# Patient Record
Sex: MALE | HISPANIC OR LATINO | Employment: UNEMPLOYED | URBAN - METROPOLITAN AREA
[De-identification: names, ages, dates, MRNs, and addresses within clinical notes are randomized per-mention and may not be internally consistent; named-entity substitution may affect disease eponyms.]

---

## 2021-09-15 ENCOUNTER — HOSPITAL ENCOUNTER (EMERGENCY)
Facility: CLINIC | Age: 21
Discharge: HOME | End: 2021-09-16
Attending: EMERGENCY MEDICINE | Admitting: EMERGENCY MEDICINE
Payer: OTHER GOVERNMENT

## 2021-09-15 DIAGNOSIS — R50.9 FEVER AND CHILLS: ICD-10-CM

## 2021-09-15 PROCEDURE — 99283 EMERGENCY DEPT VISIT LOW MDM: CPT | Performed by: EMERGENCY MEDICINE

## 2021-09-15 PROCEDURE — 99284 EMERGENCY DEPT VISIT MOD MDM: CPT | Performed by: EMERGENCY MEDICINE

## 2021-09-15 NOTE — LETTER
September 16, 2021      To Whom It May Concern:      Ruben Van was seen in our Emergency Department today, 09/16/21.  I expect his condition to improve over the next 1-3 days.  He may return to work when improved.    Sincerely,        Kaylyn Espinoza RN

## 2021-09-16 VITALS
RESPIRATION RATE: 16 BRPM | DIASTOLIC BLOOD PRESSURE: 72 MMHG | HEART RATE: 107 BPM | TEMPERATURE: 99 F | OXYGEN SATURATION: 100 % | SYSTOLIC BLOOD PRESSURE: 123 MMHG

## 2021-09-16 LAB — SARS-COV-2 RNA RESP QL NAA+PROBE: NEGATIVE

## 2021-09-16 PROCEDURE — 250N000013 HC RX MED GY IP 250 OP 250 PS 637: Performed by: EMERGENCY MEDICINE

## 2021-09-16 PROCEDURE — C9803 HOPD COVID-19 SPEC COLLECT: HCPCS | Performed by: EMERGENCY MEDICINE

## 2021-09-16 PROCEDURE — 87635 SARS-COV-2 COVID-19 AMP PRB: CPT | Performed by: EMERGENCY MEDICINE

## 2021-09-16 RX ORDER — ACETAMINOPHEN 325 MG/10.15ML
650 LIQUID ORAL ONCE
Status: COMPLETED | OUTPATIENT
Start: 2021-09-16 | End: 2021-09-16

## 2021-09-16 RX ORDER — IBUPROFEN 600 MG/1
600 TABLET, FILM COATED ORAL ONCE
Status: COMPLETED | OUTPATIENT
Start: 2021-09-16 | End: 2021-09-16

## 2021-09-16 RX ORDER — NAPROXEN 500 MG/1
500 TABLET ORAL 2 TIMES DAILY WITH MEALS
Qty: 30 TABLET | Refills: 0 | Status: SHIPPED | OUTPATIENT
Start: 2021-09-16 | End: 2021-10-01

## 2021-09-16 RX ADMIN — IBUPROFEN 600 MG: 600 TABLET ORAL at 00:47

## 2021-09-16 RX ADMIN — ACETAMINOPHEN 650 MG: 325 SOLUTION ORAL at 00:47

## 2021-09-16 ASSESSMENT — ENCOUNTER SYMPTOMS
SHORTNESS OF BREATH: 0
MYALGIAS: 1
FEVER: 1
ABDOMINAL PAIN: 0

## 2021-09-16 NOTE — ED TRIAGE NOTES
"Pt woke up today and felt \"bad\" with fever, headache, spinal pain and leg pain. With increased pain and fever pt came to ER. Pt has not had this before.   Pt took Umbral medicine to make himself feel better.Pt states he has not traveled anywhere and has not been around anyone that has traveled.  "

## 2021-09-16 NOTE — ED PROVIDER NOTES
"ED Provider Note  [facility]      History     Chief Complaint   Patient presents with     Generalized Body Aches     Pt described \"all over body aches\"     Fever     Subjective     HPI  Ruben Van is a 21 year old male who presents to the ED with complaints of fever and generalized body aches.  He woke up this way this morning.  He denies any coughing or shortness of breath.  No nausea or vomiting.  No specific pain anywhere.    Past Medical History  History reviewed. No pertinent past medical history.  History reviewed. No pertinent surgical history.  naproxen (NAPROSYN) 500 MG tablet      No Known Allergies  Family History  History reviewed. No pertinent family history.  Social History   Social History     Tobacco Use     Smoking status: Never Smoker     Smokeless tobacco: Never Used   Substance Use Topics     Alcohol use: Yes     Drug use: Never      Past medical history, past surgical history, medications, allergies, family history, and social history were reviewed with the patient. No additional pertinent items.       Review of Systems   Constitutional: Positive for fever.   Respiratory: Negative for shortness of breath.    Cardiovascular: Negative for chest pain.   Gastrointestinal: Negative for abdominal pain.   Musculoskeletal: Positive for myalgias.   All other systems reviewed and are negative.    A complete review of systems was performed with pertinent positives and negatives noted in the HPI, and all other systems negative.    Physical Exam   BP: 113/74  Pulse: 120  Temp: (!) 101.5  F (38.6  C)  Resp: 17  SpO2: 100 %  Physical Exam  Constitutional:       General: He is not in acute distress.     Appearance: He is not diaphoretic.   HENT:      Head: Normocephalic.      Mouth/Throat:      Pharynx: No oropharyngeal exudate.   Eyes:      Extraocular Movements: Extraocular movements intact.   Cardiovascular:      Heart sounds: Normal heart sounds.   Pulmonary:      Effort: No " respiratory distress.      Breath sounds: Normal breath sounds.   Abdominal:      General: There is no distension.      Palpations: Abdomen is soft.      Tenderness: There is no abdominal tenderness.   Musculoskeletal:         General: No deformity.      Cervical back: Neck supple.   Skin:     General: Skin is dry.   Neurological:      Mental Status: He is alert.      Comments: alert   Psychiatric:         Behavior: Behavior normal.         ED Course      Procedures       Results for orders placed or performed during the hospital encounter of 09/15/21   Symptomatic COVID-19 Virus (Coronavirus) by PCR Nasopharyngeal     Status: Normal    Specimen: Nasopharyngeal; Swab   Result Value Ref Range    SARS CoV2 PCR Negative Negative    Narrative    Testing was performed using the gera  SARS-CoV-2 & Influenza A/B Assay on the gera  Mulu  System.  This test should be ordered for the detection of SARS-COV-2 in individuals who meet SARS-CoV-2 clinical and/or epidemiological criteria. Test performance is unknown in asymptomatic patients.  This test is for in vitro diagnostic use under the FDA EUA for laboratories certified under CLIA to perform moderate and/or high complexity testing. This test has not been FDA cleared or approved.  A negative test does not rule out the presence of PCR inhibitors in the specimen or target RNA in concentration below the limit of detection for the assay. The possibility of a false negative should be considered if the patient's recent exposure or clinical presentation suggests COVID-19.  [facility] are certified under the Clinical Laboratory Improvement Amendments of 1988 (CLIA-88) as qualified to perform moderate and/or high complexity laboratory testing.          Results for orders placed or performed during the hospital encounter of 09/15/21   Symptomatic COVID-19 Virus (Coronavirus) by PCR Nasopharyngeal     Status: Normal    Specimen: Nasopharyngeal; Swab   Result Value  Ref Range    SARS CoV2 PCR Negative Negative    Narrative    Testing was performed using the gera  SARS-CoV-2 & Influenza A/B Assay on the gera  Mulu  System.  This test should be ordered for the detection of SARS-COV-2 in individuals who meet SARS-CoV-2 clinical and/or epidemiological criteria. Test performance is unknown in asymptomatic patients.  This test is for in vitro diagnostic use under the FDA EUA for laboratories certified under CLIA to perform moderate and/or high complexity testing. This test has not been FDA cleared or approved.  A negative test does not rule out the presence of PCR inhibitors in the specimen or target RNA in concentration below the limit of detection for the assay. The possibility of a false negative should be considered if the patient's recent exposure or clinical presentation suggests COVID-19.  [facility] are certified under the Clinical Laboratory Improvement Amendments of 1988 (CLIA-88) as qualified to perform moderate and/or high complexity laboratory testing.     Medications   ibuprofen (ADVIL/MOTRIN) tablet 600 mg (600 mg Oral Given 9/16/21 0047)   acetaminophen (TYLENOL) solution 650 mg (650 mg Oral Given 9/16/21 0047)        Assessments & Plan (with Medical Decision Making)   21-year-old male presents to us with a chief complaint of fever myalgias.  He was given Tylenol and ibuprofen which did resolve his symptoms.  Covid test was negative.  Patient had no respiratory symptoms suggesting pneumonia.  He had no abdominal tenderness or nausea and vomiting suggesting source in his abdomen.  He denies any rash.  I do not have an obvious cause for his fever.  This likely represents a viral illness.  He will be given Naprosyn to help with the symptoms and follow-up with primary care as needed.    I have reviewed the nursing notes. I have reviewed the findings, diagnosis, plan and need for follow up with the patient.    Discharge Medication List as of 9/16/2021   2:06 AM      START taking these medications    Details   naproxen (NAPROSYN) 500 MG tablet Take 1 tablet (500 mg) by mouth 2 times daily (with meals) for 15 days, Disp-30 tablet, R-0, Local Print             Final diagnoses:   Fever and chills       --    [facility] EMERGENCY DEPARTMENT  9/15/2021     Erlin Philip,   09/16/21 0254

## 2022-05-30 ENCOUNTER — APPOINTMENT (OUTPATIENT)
Dept: INTERPRETER SERVICES | Facility: CLINIC | Age: 22
End: 2022-05-30

## 2022-06-06 ENCOUNTER — HOSPITAL ENCOUNTER (EMERGENCY)
Facility: CLINIC | Age: 22
Discharge: HOME | End: 2022-06-06
Attending: INTERNAL MEDICINE | Admitting: INTERNAL MEDICINE

## 2022-06-06 VITALS
HEART RATE: 93 BPM | DIASTOLIC BLOOD PRESSURE: 78 MMHG | TEMPERATURE: 98.3 F | OXYGEN SATURATION: 100 % | SYSTOLIC BLOOD PRESSURE: 135 MMHG | RESPIRATION RATE: 18 BRPM

## 2022-06-06 DIAGNOSIS — M79.641 PAIN IN BOTH HANDS: ICD-10-CM

## 2022-06-06 DIAGNOSIS — M79.642 PAIN IN BOTH HANDS: ICD-10-CM

## 2022-06-06 LAB
ALBUMIN SERPL-MCNC: 4.2 G/DL (ref 3.4–5)
ALP SERPL-CCNC: 94 U/L (ref 40–150)
ALT SERPL W P-5'-P-CCNC: 28 U/L (ref 0–70)
ANION GAP SERPL CALCULATED.3IONS-SCNC: 7 MMOL/L (ref 3–14)
AST SERPL W P-5'-P-CCNC: 22 U/L (ref 0–45)
BASOPHILS # BLD AUTO: 0 10E3/UL (ref 0–0.2)
BASOPHILS NFR BLD AUTO: 0 %
BILIRUB SERPL-MCNC: 0.7 MG/DL (ref 0.2–1.3)
BUN SERPL-MCNC: 18 MG/DL (ref 7–30)
CALCIUM SERPL-MCNC: 9.4 MG/DL (ref 8.5–10.1)
CHLORIDE BLD-SCNC: 105 MMOL/L (ref 94–109)
CO2 SERPL-SCNC: 26 MMOL/L (ref 20–32)
CREAT SERPL-MCNC: 0.86 MG/DL (ref 0.66–1.25)
CRP SERPL-MCNC: <2.9 MG/L (ref 0–8)
EOSINOPHIL # BLD AUTO: 0.1 10E3/UL (ref 0–0.7)
EOSINOPHIL NFR BLD AUTO: 1 %
ERYTHROCYTE [DISTWIDTH] IN BLOOD BY AUTOMATED COUNT: 11.9 % (ref 10–15)
ERYTHROCYTE [SEDIMENTATION RATE] IN BLOOD BY WESTERGREN METHOD: 6 MM/HR (ref 0–15)
GFR SERPL CREATININE-BSD FRML MDRD: >90 ML/MIN/1.73M2
GLUCOSE BLD-MCNC: 104 MG/DL (ref 70–99)
HCT VFR BLD AUTO: 46.7 % (ref 40–53)
HGB BLD-MCNC: 16.1 G/DL (ref 13.3–17.7)
IMM GRANULOCYTES # BLD: 0 10E3/UL
IMM GRANULOCYTES NFR BLD: 0 %
LYMPHOCYTES # BLD AUTO: 1.7 10E3/UL (ref 0.8–5.3)
LYMPHOCYTES NFR BLD AUTO: 19 %
MAGNESIUM SERPL-MCNC: 2.2 MG/DL (ref 1.6–2.3)
MCH RBC QN AUTO: 31 PG (ref 26.5–33)
MCHC RBC AUTO-ENTMCNC: 34.5 G/DL (ref 31.5–36.5)
MCV RBC AUTO: 90 FL (ref 78–100)
MONOCYTES # BLD AUTO: 0.7 10E3/UL (ref 0–1.3)
MONOCYTES NFR BLD AUTO: 7 %
NEUTROPHILS # BLD AUTO: 6.5 10E3/UL (ref 1.6–8.3)
NEUTROPHILS NFR BLD AUTO: 73 %
NRBC # BLD AUTO: 0 10E3/UL
NRBC BLD AUTO-RTO: 0 /100
PLATELET # BLD AUTO: 218 10E3/UL (ref 150–450)
POTASSIUM BLD-SCNC: 3.4 MMOL/L (ref 3.4–5.3)
PROT SERPL-MCNC: 8 G/DL (ref 6.8–8.8)
RBC # BLD AUTO: 5.19 10E6/UL (ref 4.4–5.9)
SODIUM SERPL-SCNC: 138 MMOL/L (ref 133–144)
WBC # BLD AUTO: 9 10E3/UL (ref 4–11)

## 2022-06-06 PROCEDURE — 85652 RBC SED RATE AUTOMATED: CPT | Performed by: INTERNAL MEDICINE

## 2022-06-06 PROCEDURE — 80053 COMPREHEN METABOLIC PANEL: CPT | Performed by: INTERNAL MEDICINE

## 2022-06-06 PROCEDURE — 99283 EMERGENCY DEPT VISIT LOW MDM: CPT | Performed by: INTERNAL MEDICINE

## 2022-06-06 PROCEDURE — 86140 C-REACTIVE PROTEIN: CPT | Performed by: INTERNAL MEDICINE

## 2022-06-06 PROCEDURE — 250N000013 HC RX MED GY IP 250 OP 250 PS 637: Performed by: INTERNAL MEDICINE

## 2022-06-06 PROCEDURE — 99284 EMERGENCY DEPT VISIT MOD MDM: CPT | Performed by: INTERNAL MEDICINE

## 2022-06-06 PROCEDURE — 83735 ASSAY OF MAGNESIUM: CPT | Performed by: INTERNAL MEDICINE

## 2022-06-06 PROCEDURE — 85025 COMPLETE CBC W/AUTO DIFF WBC: CPT | Performed by: INTERNAL MEDICINE

## 2022-06-06 PROCEDURE — 36415 COLL VENOUS BLD VENIPUNCTURE: CPT | Performed by: INTERNAL MEDICINE

## 2022-06-06 RX ORDER — IBUPROFEN 600 MG/1
600 TABLET, FILM COATED ORAL ONCE
Status: COMPLETED | OUTPATIENT
Start: 2022-06-06 | End: 2022-06-06

## 2022-06-06 RX ORDER — NAPROXEN 500 MG/1
500 TABLET ORAL 2 TIMES DAILY PRN
Qty: 30 TABLET | Refills: 0 | Status: SHIPPED | OUTPATIENT
Start: 2022-06-06

## 2022-06-06 RX ADMIN — IBUPROFEN 600 MG: 600 TABLET ORAL at 20:24

## 2022-06-06 ASSESSMENT — ENCOUNTER SYMPTOMS
DIFFICULTY URINATING: 0
CONFUSION: 0
NECK STIFFNESS: 0
COLOR CHANGE: 0
ARTHRALGIAS: 1
CONSTIPATION: 0
SHORTNESS OF BREATH: 0
HEADACHES: 0
EYE REDNESS: 0
FEVER: 0
BLOOD IN STOOL: 0
DIARRHEA: 0
ABDOMINAL PAIN: 0

## 2022-06-06 NOTE — ED TRIAGE NOTES
Pt states via : that at 11am today while at work he developed bilat arm sensation as though they are on fire. Pt states no known injury. No redness to FA noted. FROM/CMS intact.     Triage Assessment     Row Name 06/06/22 2061       Triage Assessment (Adult)    Airway WDL WDL       Respiratory WDL    Respiratory WDL WDL       Skin Circulation/Temperature WDL    Skin Circulation/Temperature WDL WDL       Cardiac WDL    Cardiac WDL WDL       Peripheral/Neurovascular WDL    Peripheral Neurovascular WDL WDL       Cognitive/Neuro/Behavioral WDL    Cognitive/Neuro/Behavioral WDL WDL

## 2022-06-07 LAB
HOLD SPECIMEN: NORMAL
HOLD SPECIMEN: NORMAL

## 2022-06-07 NOTE — ED PROVIDER NOTES
[facility] [place] ([facility])    6/06/22        History     Chief Complaint   Patient presents with     Arm Pain     The history is provided by the patient and medical records. The history is limited by a language barrier. A  was used (ipad).     Ruben Van is a 22 year old male who presents to the ED for evaluation of bilateral hand and wrist pain.  Patient is Greenlandic-speaking and an iPad  was used.  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  He states he was dealing with foods such as chicken, rice, frijoles, beans, cheese, guacamole, corn; he states he did not touch any peppers.  Pain has been constant since.  States that his legs and neck are fine.  Denies swelling.  No problems with bowel or bladder.  No fever.  He also presents with small bumps on his left wrist which he believes are from chemicals that he uses at work. No other new symptoms.      Past Medical History  No past medical history on file.  No past surgical history on file.  naproxen (NAPROSYN) 500 MG tablet      No Known Allergies  Family History  No family history on file.  Social History   Social History     Tobacco Use     Smoking status: Never Smoker     Smokeless tobacco: Never Used   Substance Use Topics     Alcohol use: Yes     Drug use: Never      Past medical history, past surgical history, medications, allergies, family history, and social history were reviewed with the patient. No additional pertinent items.        Review of Systems   Constitutional: Negative for fever.   HENT: Negative for congestion.    Eyes: Negative for redness.   Respiratory: Negative for shortness of breath.    Cardiovascular: Negative for chest pain.   Gastrointestinal: Negative for abdominal pain, blood in stool, constipation and diarrhea.   Genitourinary: Negative.   Negative for difficulty urinating.   Musculoskeletal: Positive for arthralgias (Bilateral wrists). Negative for neck stiffness.   Skin: Negative for color change.   Neurological: Negative for headaches.   Psychiatric/Behavioral: Negative for confusion.     A complete review of systems was performed with pertinent positives and negatives noted in the HPI, and all other systems negative.    Physical Exam   BP: 135/78  Pulse: 93  Temp: 98.3  F (36.8  C)  Resp: 18  SpO2: 100 %  Physical Exam  Constitutional:       General: He is not in acute distress.     Appearance: He is not diaphoretic.   HENT:      Head: Atraumatic.   Eyes:      General: No scleral icterus.     Pupils: Pupils are equal, round, and reactive to light.   Cardiovascular:      Rate and Rhythm: Normal rate and regular rhythm.      Heart sounds: Normal heart sounds. No murmur heard.    No friction rub. No gallop.   Pulmonary:      Effort: Pulmonary effort is normal. No respiratory distress.      Breath sounds: Normal breath sounds. No stridor. No wheezing, rhonchi or rales.   Chest:      Chest wall: No tenderness.   Abdominal:      General: Abdomen is flat. Bowel sounds are normal. There is no distension.      Palpations: Abdomen is soft. There is no mass.      Tenderness: There is no abdominal tenderness. There is no right CVA tenderness, left CVA tenderness, guarding or rebound.      Hernia: No hernia is present.   Musculoskeletal:         General: No tenderness.      Cervical back: Neck supple.   Skin:     General: Skin is warm.      Findings: No rash.   Neurological:      General: No focal deficit present.      Cranial Nerves: No cranial nerve deficit.      Sensory: No sensory deficit.      Motor: No weakness.      Coordination: Coordination normal.      Gait: Gait normal.      Deep Tendon Reflexes: Reflexes normal.         ED Course     8:06 PM  The patient was seen and examined by Seymour Cardenas MD in Room ED18.     Procedures                      Results for orders placed or performed during the hospital encounter of 06/06/22   Comprehensive metabolic panel     Status: Abnormal   Result Value Ref Range    Sodium 138 133 - 144 mmol/L    Potassium 3.4 3.4 - 5.3 mmol/L    Chloride 105 94 - 109 mmol/L    Carbon Dioxide (CO2) 26 20 - 32 mmol/L    Anion Gap 7 3 - 14 mmol/L    Urea Nitrogen 18 7 - 30 mg/dL    Creatinine 0.86 0.66 - 1.25 mg/dL    Calcium 9.4 8.5 - 10.1 mg/dL    Glucose 104 (H) 70 - 99 mg/dL    Alkaline Phosphatase 94 40 - 150 U/L    AST 22 0 - 45 U/L    ALT 28 0 - 70 U/L    Protein Total 8.0 6.8 - 8.8 g/dL    Albumin 4.2 3.4 - 5.0 g/dL    Bilirubin Total 0.7 0.2 - 1.3 mg/dL    GFR Estimate >90 >60 mL/min/1.73m2   Magnesium     Status: Normal   Result Value Ref Range    Magnesium 2.2 1.6 - 2.3 mg/dL   CRP inflammation     Status: Normal   Result Value Ref Range    CRP Inflammation <2.9 0.0 - 8.0 mg/L   Erythrocyte sedimentation rate auto     Status: Normal   Result Value Ref Range    Erythrocyte Sedimentation Rate 6 0 - 15 mm/hr   CBC with platelets and differential     Status: None   Result Value Ref Range    WBC Count 9.0 4.0 - 11.0 10e3/uL    RBC Count 5.19 4.40 - 5.90 10e6/uL    Hemoglobin 16.1 13.3 - 17.7 g/dL    Hematocrit 46.7 40.0 - 53.0 %    MCV 90 78 - 100 fL    MCH 31.0 26.5 - 33.0 pg    MCHC 34.5 31.5 - 36.5 g/dL    RDW 11.9 10.0 - 15.0 %    Platelet Count 218 150 - 450 10e3/uL    % Neutrophils 73 %    % Lymphocytes 19 %    % Monocytes 7 %    % Eosinophils 1 %    % Basophils 0 %    % Immature Granulocytes 0 %    NRBCs per 100 WBC 0 <1 /100    Absolute Neutrophils 6.5 1.6 - 8.3 10e3/uL    Absolute Lymphocytes 1.7 0.8 - 5.3 10e3/uL    Absolute Monocytes 0.7 0.0 - 1.3 10e3/uL    Absolute Eosinophils 0.1 0.0 - 0.7 10e3/uL    Absolute Basophils 0.0 0.0 - 0.2 10e3/uL    Absolute Immature Granulocytes 0.0 <=0.4 10e3/uL    Absolute NRBCs 0.0 10e3/uL   Extra Tube     Status: None (In process)    Narrative    The following orders were created for  panel order Extra Tube.  Procedure                               Abnormality         Status                     ---------                               -----------         ------                     Extra Red Top Tube[[id number]]                               In process                   Please view results for these tests on the individual orders.   Extra Tube ([place] Draw)     Status: None (In process)    Narrative    The following orders were created for panel order Extra Tube ([place] Draw).  Procedure                               Abnormality         Status                     ---------                               -----------         ------                     Extra Blue Top Tube[[id number]]                              In process                   Please view results for these tests on the individual orders.   CBC with platelets differential     Status: None    Narrative    The following orders were created for panel order CBC with platelets differential.  Procedure                               Abnormality         Status                     ---------                               -----------         ------                     CBC with platelets and d...[[id number]]                      Final result                 Please view results for these tests on the individual orders.     Medications   ibuprofen (ADVIL/MOTRIN) tablet 600 mg (600 mg Oral Given 6/6/22 2024)        Assessments & Plan (with Medical Decision Making)  Bilateral hands pain of unclear etiology but labs including lytes and inflammatory markers normal, clinically still suspicious for capsaicin of food related chemical burns, will discharge with naproxen prn, follow up with his PMD.       I have reviewed the nursing notes. I have reviewed the findings, diagnosis, plan and need for follow up with the patient.    Discharge Medication List as of 6/6/2022 10:32 PM      START taking these medications    Details   naproxen (NAPROSYN) 500 MG tablet Take  1 tablet (500 mg) by mouth 2 times daily as needed for moderate pain, Disp-30 tablet, R-0, Local Print             Final diagnoses:   Pain in both hands     IConsuelo, am serving as a trained medical scribe to document services personally performed by Seymour Cardenas MD based on the provider's statements to me on June 6, 2022.  This document has been checked and approved by the attending provider.    ISeymour MD, was physically present and have reviewed and verified the accuracy of this note documented by Consuelo Clay, medical scribe.      Seymour Cardenas MD      --  Seymour Cardenas Md  [facility] EMERGENCY DEPARTMENT  6/6/2022     Seymour Cardenas MD  06/06/22 [id number]       Seymour Cardenas MD  06/06/22 [id number]

## 2023-03-05 ENCOUNTER — HOSPITAL ENCOUNTER (EMERGENCY)
Facility: CLINIC | Age: 23
Discharge: HOME | End: 2023-03-05
Attending: EMERGENCY MEDICINE | Admitting: EMERGENCY MEDICINE
Payer: MEDICAID

## 2023-03-05 ENCOUNTER — APPOINTMENT (OUTPATIENT)
Dept: CT IMAGING | Facility: CLINIC | Age: 23
End: 2023-03-05
Attending: EMERGENCY MEDICINE

## 2023-03-05 VITALS
OXYGEN SATURATION: 99 % | DIASTOLIC BLOOD PRESSURE: 74 MMHG | WEIGHT: 129.4 LBS | TEMPERATURE: 98.4 F | SYSTOLIC BLOOD PRESSURE: 122 MMHG | HEIGHT: 60 IN | RESPIRATION RATE: 18 BRPM | HEART RATE: 92 BPM | BODY MASS INDEX: 25.4 KG/M2

## 2023-03-05 DIAGNOSIS — R10.31 ABDOMINAL PAIN, RIGHT LOWER QUADRANT: ICD-10-CM

## 2023-03-05 LAB
ALBUMIN SERPL BCG-MCNC: 4.8 G/DL (ref 3.5–5.2)
ALP SERPL-CCNC: 90 U/L (ref 40–129)
ALT SERPL W P-5'-P-CCNC: 21 U/L (ref 10–50)
ANION GAP SERPL CALCULATED.3IONS-SCNC: 14 MMOL/L (ref 7–15)
AST SERPL W P-5'-P-CCNC: 24 U/L (ref 10–50)
BASOPHILS # BLD AUTO: 0 10E3/UL (ref 0–0.2)
BASOPHILS NFR BLD AUTO: 1 %
BILIRUB SERPL-MCNC: 1.2 MG/DL
BUN SERPL-MCNC: 12.6 MG/DL (ref 6–20)
CALCIUM SERPL-MCNC: 9.6 MG/DL (ref 8.6–10)
CHLORIDE SERPL-SCNC: 98 MMOL/L (ref 98–107)
CREAT SERPL-MCNC: 0.95 MG/DL (ref 0.67–1.17)
DEPRECATED HCO3 PLAS-SCNC: 25 MMOL/L (ref 22–29)
EOSINOPHIL # BLD AUTO: 0 10E3/UL (ref 0–0.7)
EOSINOPHIL NFR BLD AUTO: 1 %
ERYTHROCYTE [DISTWIDTH] IN BLOOD BY AUTOMATED COUNT: 11.9 % (ref 10–15)
GFR SERPL CREATININE-BSD FRML MDRD: >90 ML/MIN/1.73M2
GLUCOSE SERPL-MCNC: 107 MG/DL (ref 70–99)
HCT VFR BLD AUTO: 46.2 % (ref 40–53)
HGB BLD-MCNC: 16.2 G/DL (ref 13.3–17.7)
IMM GRANULOCYTES # BLD: 0 10E3/UL
IMM GRANULOCYTES NFR BLD: 0 %
LYMPHOCYTES # BLD AUTO: 1.2 10E3/UL (ref 0.8–5.3)
LYMPHOCYTES NFR BLD AUTO: 23 %
MCH RBC QN AUTO: 31 PG (ref 26.5–33)
MCHC RBC AUTO-ENTMCNC: 35.1 G/DL (ref 31.5–36.5)
MCV RBC AUTO: 88 FL (ref 78–100)
MONOCYTES # BLD AUTO: 0.7 10E3/UL (ref 0–1.3)
MONOCYTES NFR BLD AUTO: 13 %
NEUTROPHILS # BLD AUTO: 3.3 10E3/UL (ref 1.6–8.3)
NEUTROPHILS NFR BLD AUTO: 62 %
NRBC # BLD AUTO: 0 10E3/UL
NRBC BLD AUTO-RTO: 0 /100
PLATELET # BLD AUTO: 247 10E3/UL (ref 150–450)
POTASSIUM SERPL-SCNC: 3.5 MMOL/L (ref 3.4–5.3)
PROT SERPL-MCNC: 7.8 G/DL (ref 6.4–8.3)
RBC # BLD AUTO: 5.23 10E6/UL (ref 4.4–5.9)
SODIUM SERPL-SCNC: 137 MMOL/L (ref 136–145)
WBC # BLD AUTO: 5.2 10E3/UL (ref 4–11)

## 2023-03-05 PROCEDURE — 96361 HYDRATE IV INFUSION ADD-ON: CPT | Performed by: EMERGENCY MEDICINE

## 2023-03-05 PROCEDURE — 99284 EMERGENCY DEPT VISIT MOD MDM: CPT | Performed by: EMERGENCY MEDICINE

## 2023-03-05 PROCEDURE — 250N000011 HC RX IP 250 OP 636: Performed by: EMERGENCY MEDICINE

## 2023-03-05 PROCEDURE — 74177 CT ABD & PELVIS W/CONTRAST: CPT

## 2023-03-05 PROCEDURE — 85025 COMPLETE CBC W/AUTO DIFF WBC: CPT | Performed by: EMERGENCY MEDICINE

## 2023-03-05 PROCEDURE — 84155 ASSAY OF PROTEIN SERUM: CPT | Performed by: EMERGENCY MEDICINE

## 2023-03-05 PROCEDURE — 99285 EMERGENCY DEPT VISIT HI MDM: CPT | Mod: 25 | Performed by: EMERGENCY MEDICINE

## 2023-03-05 PROCEDURE — 250N000009 HC RX 250: Performed by: EMERGENCY MEDICINE

## 2023-03-05 PROCEDURE — 36415 COLL VENOUS BLD VENIPUNCTURE: CPT | Performed by: EMERGENCY MEDICINE

## 2023-03-05 PROCEDURE — 96374 THER/PROPH/DIAG INJ IV PUSH: CPT | Mod: 59 | Performed by: EMERGENCY MEDICINE

## 2023-03-05 PROCEDURE — 96375 TX/PRO/DX INJ NEW DRUG ADDON: CPT | Performed by: EMERGENCY MEDICINE

## 2023-03-05 PROCEDURE — 258N000003 HC RX IP 258 OP 636: Performed by: EMERGENCY MEDICINE

## 2023-03-05 RX ORDER — HYDROMORPHONE HYDROCHLORIDE 1 MG/ML
0.5 INJECTION, SOLUTION INTRAMUSCULAR; INTRAVENOUS; SUBCUTANEOUS ONCE
Status: COMPLETED | OUTPATIENT
Start: 2023-03-05 | End: 2023-03-05

## 2023-03-05 RX ORDER — IOPAMIDOL 755 MG/ML
100 INJECTION, SOLUTION INTRAVASCULAR ONCE
Status: COMPLETED | OUTPATIENT
Start: 2023-03-05 | End: 2023-03-05

## 2023-03-05 RX ORDER — KETOROLAC TROMETHAMINE 15 MG/ML
15 INJECTION, SOLUTION INTRAMUSCULAR; INTRAVENOUS ONCE
Status: COMPLETED | OUTPATIENT
Start: 2023-03-05 | End: 2023-03-05

## 2023-03-05 RX ADMIN — SODIUM CHLORIDE 31 ML: 9 INJECTION, SOLUTION INTRAVENOUS at 18:05

## 2023-03-05 RX ADMIN — IOPAMIDOL 64 ML: 755 INJECTION, SOLUTION INTRAVENOUS at 18:05

## 2023-03-05 RX ADMIN — SODIUM CHLORIDE 1000 ML: 9 INJECTION, SOLUTION INTRAVENOUS at 16:15

## 2023-03-05 RX ADMIN — KETOROLAC TROMETHAMINE 15 MG: 15 INJECTION, SOLUTION INTRAMUSCULAR; INTRAVENOUS at 16:15

## 2023-03-05 RX ADMIN — HYDROMORPHONE HYDROCHLORIDE 0.5 MG: 1 INJECTION, SOLUTION INTRAMUSCULAR; INTRAVENOUS; SUBCUTANEOUS at 16:15

## 2023-03-05 ASSESSMENT — ACTIVITIES OF DAILY LIVING (ADL)
ADLS_ACUITY_SCORE: 35
ADLS_ACUITY_SCORE: 35

## 2023-03-05 ASSESSMENT — VISUAL ACUITY: OU: 1

## 2023-03-05 NOTE — ED TRIAGE NOTES
Triage Assessment     Row Name 03/05/23 1553       Triage Assessment (Adult)    Airway WDL WDL       Respiratory WDL    Respiratory WDL WDL

## 2023-03-05 NOTE — ED PROVIDER NOTES
ED Provider Note  [facility]      History     Chief Complaint   Patient presents with     Abdominal Pain     Right quadrant abdomen pain radiating to back. Was seen at [facility].     HPI  Ruben Van is a 22 year old male who arrives today to the emergency department for evaluation of persistent abdominal pain.  Patient reports intermittent pain in the right lower quadrant over the past several weeks.  He was evaluated several weeks ago in outside facility and CT imaging on February 18 demonstrated what appeared to be mesenteric adenitis.  The patient has since been seen multiple times in ERs or urgent cares with ongoing pain with reassuring laboratory studies and urinalysis.  Patient reports some escalation of pain predominately on the right specifically right lower quadrant.  No associated nausea, vomiting, diarrhea.  Patient reports no prior surgery.  Pain moderate to severe in intensity at this time.  Has not yet taken thing for pain.  Patient reports no trauma.  No dysuria, urinary frequency, gross hematuria.    Past Medical History  History reviewed. No pertinent past medical history.  History reviewed. No pertinent surgical history.  naproxen (NAPROSYN) 500 MG tablet      No Known Allergies  Family History  History reviewed. No pertinent family history.  Social History   Social History     Tobacco Use     Smoking status: Never     Smokeless tobacco: Never   Substance Use Topics     Alcohol use: Yes     Drug use: Never      Past medical history, past surgical history, medications, allergies, family history, and social history were reviewed with the patient. No additional pertinent items.      A medically appropriate review of systems was performed with pertinent positives and negatives noted in the HPI, and all other systems negative.    Physical Exam   BP: 132/83  Pulse: 83  Temp: 98.5  F (36.9  C)  Resp: 16  Height: 152.4 cm (5')  Weight: 58.7 kg (129 lb 6.4 oz)  SpO2: 100  %  Physical Exam  Vitals and nursing note reviewed.   Constitutional:       General: He is in acute distress.      Appearance: Normal appearance. He is normal weight. He is not ill-appearing, toxic-appearing or diaphoretic.   HENT:      Head: Normocephalic and atraumatic.      Right Ear: External ear normal.      Left Ear: External ear normal.      Nose: Nose normal. No congestion or rhinorrhea.      Mouth/Throat:      Lips: Pink. No lesions.      Mouth: Mucous membranes are moist. No injury or oral lesions.      Pharynx: Oropharynx is clear. No pharyngeal swelling or posterior oropharyngeal erythema.   Eyes:      General: Lids are normal. Vision grossly intact. Gaze aligned appropriately.      Extraocular Movements: Extraocular movements intact.      Conjunctiva/sclera: Conjunctivae normal.   Cardiovascular:      Rate and Rhythm: Normal rate and regular rhythm.      Pulses: Normal pulses.      Heart sounds: No murmur heard.  Pulmonary:      Effort: Pulmonary effort is normal. No respiratory distress.      Breath sounds: Normal breath sounds. No wheezing, rhonchi or rales.   Chest:      Chest wall: No tenderness.   Abdominal:      Tenderness: There is abdominal tenderness in the right upper quadrant.   Musculoskeletal:         General: No signs of injury.      Cervical back: Full passive range of motion without pain and normal range of motion. No rigidity.   Skin:     General: Skin is warm and dry.      Capillary Refill: Capillary refill takes less than 2 seconds.      Coloration: Skin is not pale.      Findings: No erythema.   Neurological:      General: No focal deficit present.      Mental Status: He is alert.      Cranial Nerves: No cranial nerve deficit.   Psychiatric:         Mood and Affect: Mood normal.         Behavior: Behavior normal.           ED Course, Procedures, & Data      Procedures                     Results for orders placed or performed during the hospital encounter of 03/05/23   CT Abdomen  Pelvis w Contrast     Status: None    Narrative    EXAM: CT ABDOMEN PELVIS W CONTRAST  LOCATION: [facility]  DATE/TIME: 3/5/2023 6:17 PM    INDICATION: RLQ pain  COMPARISON: None.  TECHNIQUE: CT scan of the abdomen and pelvis was performed following injection of IV contrast. Multiplanar reformats were obtained. Dose reduction techniques were used.  CONTRAST: 64 ml Isovue 370    FINDINGS:   LOWER CHEST: Normal.    HEPATOBILIARY: Normal.    PANCREAS: Normal.    SPLEEN: Normal.    ADRENAL GLANDS: Normal.    KIDNEYS/BLADDER: Normal.    BOWEL: No free air, free fluid, or inflammatory change. No obstruction. Normal appendix.    LYMPH NODES: Normal.    VASCULATURE: Unremarkable.    PELVIC ORGANS: Normal.    MUSCULOSKELETAL: Normal.      Impression    IMPRESSION:   1.  No evidence for appendicitis or other acute abnormality in the abdomen or pelvis.   Comprehensive metabolic panel     Status: Abnormal   Result Value Ref Range    Sodium 137 136 - 145 mmol/L    Potassium 3.5 3.4 - 5.3 mmol/L    Chloride 98 98 - 107 mmol/L    Carbon Dioxide (CO2) 25 22 - 29 mmol/L    Anion Gap 14 7 - 15 mmol/L    Urea Nitrogen 12.6 6.0 - 20.0 mg/dL    Creatinine 0.95 0.67 - 1.17 mg/dL    Calcium 9.6 8.6 - 10.0 mg/dL    Glucose 107 (H) 70 - 99 mg/dL    Alkaline Phosphatase 90 40 - 129 U/L    AST 24 10 - 50 U/L    ALT 21 10 - 50 U/L    Protein Total 7.8 6.4 - 8.3 g/dL    Albumin 4.8 3.5 - 5.2 g/dL    Bilirubin Total 1.2 <=1.2 mg/dL    GFR Estimate >90 >60 mL/min/1.73m2   CBC with platelets and differential     Status: None   Result Value Ref Range    WBC Count 5.2 4.0 - 11.0 10e3/uL    RBC Count 5.23 4.40 - 5.90 10e6/uL    Hemoglobin 16.2 13.3 - 17.7 g/dL    Hematocrit 46.2 40.0 - 53.0 %    MCV 88 78 - 100 fL    MCH 31.0 26.5 - 33.0 pg    MCHC 35.1 31.5 - 36.5 g/dL    RDW 11.9 10.0 - 15.0 %    Platelet Count 247 150 - 450 10e3/uL    % Neutrophils 62 %    % Lymphocytes 23 %    % Monocytes 13 %    %  Eosinophils 1 %    % Basophils 1 %    % Immature Granulocytes 0 %    NRBCs per 100 WBC 0 <1 /100    Absolute Neutrophils 3.3 1.6 - 8.3 10e3/uL    Absolute Lymphocytes 1.2 0.8 - 5.3 10e3/uL    Absolute Monocytes 0.7 0.0 - 1.3 10e3/uL    Absolute Eosinophils 0.0 0.0 - 0.7 10e3/uL    Absolute Basophils 0.0 0.0 - 0.2 10e3/uL    Absolute Immature Granulocytes 0.0 <=0.4 10e3/uL    Absolute NRBCs 0.0 10e3/uL   CBC with platelets differential     Status: None    Narrative    The following orders were created for panel order CBC with platelets differential.  Procedure                               Abnormality         Status                     ---------                               -----------         ------                     CBC with platelets and d...[[id number]]                      Final result                 Please view results for these tests on the individual orders.     Medications   HYDROmorphone (PF) (DILAUDID) injection 0.5 mg (0.5 mg Intravenous $Given 3/5/23 1615)   ketorolac (TORADOL) injection 15 mg (15 mg Intravenous $Given 3/5/23 1615)   0.9% sodium chloride BOLUS (0 mLs Intravenous Stopped 3/5/23 1726)   iopamidol (ISOVUE-370) solution 100 mL (64 mLs Intravenous $Given 3/5/23 1805)   sodium chloride 0.9 % bag 500mL for CT scan flush use (31 mLs Intravenous $Given 3/5/23 1805)     Labs Ordered and Resulted from Time of ED Arrival to Time of ED Departure   COMPREHENSIVE METABOLIC PANEL - Abnormal       Result Value    Sodium 137      Potassium 3.5      Chloride 98      Carbon Dioxide (CO2) 25      Anion Gap 14      Urea Nitrogen 12.6      Creatinine 0.95      Calcium 9.6      Glucose 107 (*)     Alkaline Phosphatase 90      AST 24      ALT 21      Protein Total 7.8      Albumin 4.8      Bilirubin Total 1.2      GFR Estimate >90     CBC WITH PLATELETS AND DIFFERENTIAL    WBC Count 5.2      RBC Count 5.23      Hemoglobin 16.2      Hematocrit 46.2      MCV 88      MCH 31.0      MCHC 35.1      RDW 11.9       Platelet Count 247      % Neutrophils 62      % Lymphocytes 23      % Monocytes 13      % Eosinophils 1      % Basophils 1      % Immature Granulocytes 0      NRBCs per 100 WBC 0      Absolute Neutrophils 3.3      Absolute Lymphocytes 1.2      Absolute Monocytes 0.7      Absolute Eosinophils 0.0      Absolute Basophils 0.0      Absolute Immature Granulocytes 0.0      Absolute NRBCs 0.0       CT Abdomen Pelvis w Contrast   Final Result   IMPRESSION:    1.  No evidence for appendicitis or other acute abnormality in the abdomen or pelvis.                 Assessment & Plan      Ruben Van is a 22 year old male who arrives today to the emergency department for evaluation of persistent abdominal pain.  On arrival patient noted alert.  Presently afebrile and he medically stable.  Patient is lying supine in bed and appears to be somewhat uncomfortable is nontoxic.  External evaluation abdomen demonstrates no trauma.  No distention.  Patient is fairly tender on the right worse or more so in the right lower quadrant.  No involuntary guarding, mass, hernia appreciated.  Etiology of symptoms unclear.  I have reviewed multiple notes including outside facility documenting persistent pain.  I reviewed CT scan independently on February 18 demonstrating mesenteric adenitis.  Concern at this time is that while appendix did appear normal at that time as patient has had escalation of pain.  Somewhat of a red flag the patient has had ongoing or escalating pain.  I would plan for repeat laboratory studies to assess trend and imaging.  Low suspicion for other cause such as perforated viscus, volvulus, urologic pathology such as stone or pyelonephritis.  I did review urinalysis recently demonstrate no sign of UTI.    Laboratory studies reviewed demonstrate no significant leukocytosis, anemia or electrolyte abnormality.  I did obtain a CT scan and I reviewed this demonstrating no sign of obvious acute pathology.  Patient has had some  improvement of symptoms.  He is requesting outpatient follow-up with a gastroenterologist.  This was placed electronically.  I have strongly encouraged him to continue follow-up with his primary care physician for consideration of other nonemergent causes possible hydrogen breath testing.  We are certainly happy to reevaluate this patient at any time should have any change, progression or worsening of symptoms.    I have reviewed the nursing notes. I have reviewed the findings, diagnosis, plan and need for follow up with the patient.    New Prescriptions    No medications on file       Final diagnoses:   Abdominal pain, right lower quadrant       Elio Harper  [facility] EMERGENCY DEPARTMENT  3/5/2023     Elio Harper MD  03/05/23 1475

## 2023-03-06 NOTE — DISCHARGE INSTRUCTIONS
I was happy to see that your vital signs are reassuring with a normal heart rate and temperature.  You have no sign of fever at this time.  I did obtain laboratory studies that demonstrate no significant abnormality.  Your white blood cell count the cells that fight infection are normal.  We did repeat a CT scan that was read as normal with no sign of obvious cause for your pain.  This is reassuring.  Typically if you are able to rule out the dangerous things pain oftentimes will subside with normal diet, increasing fluids and fiber.  I would plan to follow back up with your primary care physician for consideration of other nonemergent causes of abdominal pain.

## 2023-03-07 ENCOUNTER — TELEPHONE (OUTPATIENT)
Dept: GASTROENTEROLOGY | Facility: CLINIC | Age: 23
End: 2023-03-07
Payer: MEDICAID

## 2023-03-07 ENCOUNTER — APPOINTMENT (OUTPATIENT)
Dept: INTERPRETER SERVICES | Facility: CLINIC | Age: 23
End: 2023-03-07
Payer: MEDICAID

## 2023-03-07 NOTE — TELEPHONE ENCOUNTER
Health Call Center    Phone Message    May a detailed message be left on voicemail: yes     Reason for Call: Appointment Intake    Referring Provider Name: Elio Harper MD  Diagnosis and/or Symptoms: Abdominal pain, right lower quadrant [R10.31]    Per triage notes, patient is to be seen as a GI Urgent, but is currently scheduled on 05/30/2023 with Dr. Cami Orona. Current appointment is outside requested time frame. Please review for further follow up per scheduling guidelines. Thanks!     Action Taken: Message routed to:  [facility] ([facility]): GI    Travel Screening: Not Applicable

## 2023-03-13 NOTE — TELEPHONE ENCOUNTER
REFERRAL INFORMATION:    Referring Provider:  Dr. Elio Harper    Referring Clinic:  [facility] [place], operated by [facility]    Reason for Visit/Diagnosis: abd pain     FUTURE VISIT INFORMATION:    Appointment Date: 05-30-23    Appointment Time: @ 4:20pm     NOTES STATUS DETAILS   OFFICE NOTE from Referring Provider Internal 03-05-23 Dr. Elio Harper   OFFICE NOTE from Other Specialist N/A    HOSPITAL DISCHARGE SUMMARY/  ED VISITS Internal/Care everywhere 03-05-23 Dr. Elio Harper  03-04-23 Dr. Michelle Cuba  02-24-23 Lita Rahman, APRN CNP  02-18-23 Dr. Jess Mao   OPERATIVE REPORT N/A    MEDICATION LIST Internal         ENDOSCOPY  N/A    COLONOSCOPY N/A    ERCP N/A    EUS N/A    STOOL TESTING N/A    PERTINENT LABS Internal/Care everywhere C-diff: 03-05-23, 02-24-23, 02-18-23, 06-06-22*additional in epic*   PATHOLOGY REPORTS (RELATED) N/A    IMAGING (CT, MRI, EGD, MRCP, Small Bowel Follow Through/SBT, MR/CT Enterography) Internal/Care everywhere  CT abd: 05-12-23, 03-05-23, 02-18-23  US abd: 05-11-23, 04-06-23, 02-18-23 03-20-23 request/images [facility] @ 1:24pm    UPDATE:   05-17-23 sent 2nd request for needed imaging/[facility](fx# [phone]) @ 12:50pm RECVD pushed to PACS

## 2023-04-04 NOTE — TELEPHONE ENCOUNTER
Called Pt to help get them scheduled for a sooner GI appointment.  was unable to leave a voice message; phone went straight to a loud beep and the line disconnected.     This is the 3rd attempt in trying to reach the Pt.; MyChart was also send and it was read by the Pt. Writer will be closing encounter now.

## 2023-05-20 ENCOUNTER — HEALTH MAINTENANCE LETTER (OUTPATIENT)
Age: 23
End: 2023-05-20

## 2023-05-23 ENCOUNTER — OFFICE VISIT (OUTPATIENT)
Dept: GASTROENTEROLOGY | Facility: CLINIC | Age: 23
End: 2023-05-23
Attending: EMERGENCY MEDICINE
Payer: MEDICAID

## 2023-05-23 VITALS
BODY MASS INDEX: 23.63 KG/M2 | WEIGHT: 121 LBS | HEART RATE: 75 BPM | OXYGEN SATURATION: 99 % | SYSTOLIC BLOOD PRESSURE: 120 MMHG | DIASTOLIC BLOOD PRESSURE: 77 MMHG

## 2023-05-23 DIAGNOSIS — K92.1 HEMATOCHEZIA: Primary | ICD-10-CM

## 2023-05-23 DIAGNOSIS — R10.31 ABDOMINAL PAIN, RIGHT LOWER QUADRANT: ICD-10-CM

## 2023-05-23 PROCEDURE — 99204 OFFICE O/P NEW MOD 45 MIN: CPT | Mod: GC | Performed by: STUDENT IN AN ORGANIZED HEALTH CARE EDUCATION/TRAINING PROGRAM

## 2023-05-23 RX ORDER — POLYETHYLENE GLYCOL 3350 17 G/17G
1 POWDER, FOR SOLUTION ORAL DAILY
Qty: 238 G | Refills: 3 | Status: SHIPPED | OUTPATIENT
Start: 2023-05-23

## 2023-05-23 ASSESSMENT — PAIN SCALES - GENERAL: PAINLEVEL: EXTREME PAIN (8)

## 2023-05-23 NOTE — NURSING NOTE
Chief Complaint   Patient presents with     New Patient       Vitals:    05/23/23 1244   BP: 120/77   Pulse: 75   SpO2: 99%   Weight: 54.9 kg (121 lb)       Body mass index is 23.63 kg/m .    PHQ-2 Score:         5/23/2023    12:46 PM   PHQ-2 ( 1999 [employer])   Q1: Little interest or pleasure in doing things 0   Q2: Feeling down, depressed or hopeless 1   PHQ-2 Score 1       Candace Lopez

## 2023-05-23 NOTE — LETTER
5/23/2023         RE: Ruben Van  [address]  [place] [address]        Dear Colleague,    Thank you for referring your patient, Ruben Van, to the [facility] [place]. Please see a copy of my visit note below.    GI CLINIC VISIT    CC/REFERRING MD:  Elio Harper  REASON FOR CONSULTATION:   Elio Harper for   Chief Complaint   Patient presents with    New Patient       ASSESSMENT/PLAN:  24yo M w/ no significant PMH presenting for abdominal pain.     #RUQ abdominal pain  #Constipation  Present since February; nml LFTs recently and recent CT was unrevealing for a cause of his pain (small bowel to small bowel intussusception thought to be transient and unrelated to the pain per surgery); as pain improves w/ BMs, most likely this represents IBS-C  - Continue Miralax    #Hematochezia  Notes that he sees blood in the stools for the past week; no melena. No family hx of IBD or CRC, no weight loss; nml Hgb on last check this month; in the setting of hard stools for which he needs to strain, highest suspicion is hemorrhoids  - Colonoscopy under MAC    RTC: 4mo       90 minutes spent on the date of the encounter performing chart review, history and exam, documentation and further activities as noted above.  .    Patient was seen and d/w Dr. Gaston Orona MD  GI Fellow        HPI  24yo M w/ no significant PMH presenting for abdominal pain.     The patient notes that since February of this year, he has had dull, stabbing RUQ pain which lasts for 2-3hrs. It is more intense after eating, pain improved after he has a BM. He has some nausea but no vomiting, also w/ reflux. He has 1 hard stool per day and needs to strain to have a BM.The stools are hard mixed with some soft stool. He notes that he has been seeing blood in his stool for the past week each time he has a BM. No melena. He tried Miralax for 2-3 days 1x/day which lead to liquidy stools  2x/day but didn't affect the pain.     He presented to the ED on 5/11 for this pain and CMP and CBC were nml. CT showed L mesenteric lymphadenopathy which was nonspecific. It also showed a short-length small bowel to small bowel intussusception. Surgery saw him and noted this was likely transient and unrelated to his pain.     ROS:    Negative except per HPI    PROBLEM LIST  There are no problems to display for this patient.      PERTINENT PAST MEDICAL HISTORY:  No past medical history on file.    PREVIOUS SURGERIES:  No past surgical history on file.    PREVIOUS ENDOSCOPY:  none    ALLERGIES:   No Known Allergies    PERTINENT MEDICATIONS:    Current Outpatient Medications:     naproxen (NAPROSYN) 500 MG tablet, Take 1 tablet (500 mg) by mouth 2 times daily as needed for moderate pain, Disp: 30 tablet, Rfl: 0    polyethylene glycol (MIRALAX) 17 GM/Dose powder, Take 17 g (1 Capful) by mouth daily, Disp: 238 g, Rfl: 3    SOCIAL HISTORY:  Social History     Socioeconomic History    Marital status: Single     Spouse name: Not on file    Number of children: Not on file    Years of education: Not on file    Highest education level: Not on file   Occupational History    Not on file   Tobacco Use    Smoking status: Never    Smokeless tobacco: Never   Vaping Use    Vaping status: Not on file   Substance and Sexual Activity    Alcohol use: Yes    Drug use: Never    Sexual activity: Not on file   Other Topics Concern    Not on file   Social History Narrative    Not on file     Social Determinants of Health     Financial Resource Strain: Not on file   Food Insecurity: Not on file   Transportation Needs: Not on file   Physical Activity: Not on file   Stress: Not on file   Social Connections: Not on file   Intimate Partner Violence: Not on file   Housing Stability: Not on file       FAMILY HISTORY:  No family history on file.    Past/family/social history reviewed and no changes    PHYSICAL EXAMINATION:  Constitutional: aaox3,  cooperative, pleasant, not dyspneic/diaphoretic, no acute distress  Vitals reviewed: /77   Pulse 75   Wt 54.9 kg (121 lb)   SpO2 99%   BMI 23.63 kg/m    Wt:   Wt Readings from Last 2 Encounters:   05/23/23 54.9 kg (121 lb)   03/05/23 58.7 kg (129 lb 6.4 oz)      Eyes: Sclera anicteric/injected  Ears/nose/mouth/throat: hearing intact  CV: No edema  Respiratory: Unlabored breathing  Abd: Nondistended, nontender, no peritoneal signs  Rectal: normal muscle contractions, no blood  Skin: warm, perfused, no jaundice  Psych: Normal affect  MSK: Normal gait      PERTINENT STUDIES:    No results found for any previous visit.       Sincerely,        Cami Orona MD

## 2023-05-24 ENCOUNTER — TELEPHONE (OUTPATIENT)
Dept: GASTROENTEROLOGY | Facility: CLINIC | Age: 23
End: 2023-05-24

## 2023-05-24 NOTE — TELEPHONE ENCOUNTER
Received message from [facility].   Patient will need Extended Golytely Prep for future colonoscopy procedure per Dr. Orona.     Fifi Vazquez, RN  P Endoscopy Pre Assessment Nurse Pool; Fifi Vazquez, RN  HelloDr. Orona put in orders for this patient's colonoscopy, but I don't see a note within the order. Could you make a note on your end that he will need an extended prep?     Thank you,   Fifi Vazquez   RN GI Care Coordinator   Phone: [phone]           Previous Messages       ----- Message -----   From: Cami Orona MD   Sent: 5/23/2023   4:49 PM CDT   To: NISHA Santos,   Can you help me make sure this patient gets an extended prep for his colonoscopy?   Thank you,   Cami Dumont, NISHA  Endoscopy Procedure Pre-Assessment RN

## 2023-05-30 ENCOUNTER — PRE VISIT (OUTPATIENT)
Dept: GASTROENTEROLOGY | Facility: CLINIC | Age: 23
End: 2023-05-30

## 2023-05-31 NOTE — PROGRESS NOTES
GI CLINIC VISIT    CC/REFERRING MD:  Elio Harper  REASON FOR CONSULTATION:   Elio Harper for   Chief Complaint   Patient presents with     New Patient       ASSESSMENT/PLAN:  22yo M w/ no significant PMH presenting for abdominal pain.     #RUQ abdominal pain  #Constipation  Present since February; nml LFTs recently and recent CT was unrevealing for a cause of his pain (small bowel to small bowel intussusception thought to be transient and unrelated to the pain per surgery); as pain improves w/ BMs, most likely this represents IBS-C  - Continue Miralax    #Hematochezia  Notes that he sees blood in the stools for the past week; no melena. No family hx of IBD or CRC, no weight loss; nml Hgb on last check this month; in the setting of hard stools for which he needs to strain, highest suspicion is hemorrhoids  - Colonoscopy under MAC    RTC: 4mo       90 minutes spent on the date of the encounter performing chart review, history and exam, documentation and further activities as noted above.  .    Patient was seen and d/w Dr. Gaston Orona MD  GI Fellow        HPI  22yo M w/ no significant PMH presenting for abdominal pain.     The patient notes that since February of this year, he has had dull, stabbing RUQ pain which lasts for 2-3hrs. It is more intense after eating, pain improved after he has a BM. He has some nausea but no vomiting, also w/ reflux. He has 1 hard stool per day and needs to strain to have a BM.The stools are hard mixed with some soft stool. He notes that he has been seeing blood in his stool for the past week each time he has a BM. No melena. He tried Miralax for 2-3 days 1x/day which lead to liquidy stools 2x/day but didn't affect the pain.     He presented to the ED on 5/11 for this pain and CMP and CBC were nml. CT showed L mesenteric lymphadenopathy which was nonspecific. It also showed a short-length small bowel to small bowel intussusception. Surgery saw him and noted  this was likely transient and unrelated to his pain.     ROS:    Negative except per HPI    PROBLEM LIST  There are no problems to display for this patient.      PERTINENT PAST MEDICAL HISTORY:  No past medical history on file.    PREVIOUS SURGERIES:  No past surgical history on file.    PREVIOUS ENDOSCOPY:  none    ALLERGIES:   No Known Allergies    PERTINENT MEDICATIONS:    Current Outpatient Medications:      naproxen (NAPROSYN) 500 MG tablet, Take 1 tablet (500 mg) by mouth 2 times daily as needed for moderate pain, Disp: 30 tablet, Rfl: 0     polyethylene glycol (MIRALAX) 17 GM/Dose powder, Take 17 g (1 Capful) by mouth daily, Disp: 238 g, Rfl: 3    SOCIAL HISTORY:  Social History     Socioeconomic History     Marital status: Single     Spouse name: Not on file     Number of children: Not on file     Years of education: Not on file     Highest education level: Not on file   Occupational History     Not on file   Tobacco Use     Smoking status: Never     Smokeless tobacco: Never   Vaping Use     Vaping status: Not on file   Substance and Sexual Activity     Alcohol use: Yes     Drug use: Never     Sexual activity: Not on file   Other Topics Concern     Not on file   Social History Narrative     Not on file     Social Determinants of Health     Financial Resource Strain: Not on file   Food Insecurity: Not on file   Transportation Needs: Not on file   Physical Activity: Not on file   Stress: Not on file   Social Connections: Not on file   Intimate Partner Violence: Not on file   Housing Stability: Not on file       FAMILY HISTORY:  No family history on file.    Past/family/social history reviewed and no changes    PHYSICAL EXAMINATION:  Constitutional: aaox3, cooperative, pleasant, not dyspneic/diaphoretic, no acute distress  Vitals reviewed: /77   Pulse 75   Wt 54.9 kg (121 lb)   SpO2 99%   BMI 23.63 kg/m    Wt:   Wt Readings from Last 2 Encounters:   05/23/23 54.9 kg (121 lb)   03/05/23 58.7 kg (129 lb  6.4 oz)      Eyes: Sclera anicteric/injected  Ears/nose/mouth/throat: hearing intact  CV: No edema  Respiratory: Unlabored breathing  Abd: Nondistended, nontender, no peritoneal signs  Rectal: normal muscle contractions, no blood  Skin: warm, perfused, no jaundice  Psych: Normal affect  MSK: Normal gait      PERTINENT STUDIES:    No results found for any previous visit.

## 2023-07-13 ENCOUNTER — TELEPHONE (OUTPATIENT)
Dept: GASTROENTEROLOGY | Facility: CLINIC | Age: 23
End: 2023-07-13
Payer: MEDICAID

## 2023-07-13 ENCOUNTER — HOSPITAL ENCOUNTER (OUTPATIENT)
Facility: CLINIC | Age: 23
End: 2023-07-13
Attending: INTERNAL MEDICINE | Admitting: INTERNAL MEDICINE
Payer: MEDICAID

## 2023-07-13 NOTE — TELEPHONE ENCOUNTER
"Endoscopy Scheduling Screen    Have you had a positive Covid test in the last 14 days?  No    Are you active on MyChart?   Yes    What insurance is in the chart?  Other:  NO INSURANCE - OUT OF POCKET     Ordering/Referring Provider: KIEL BONNER   (If ordering provider performs procedure, schedule with ordering provider unless otherwise instructed. )    BMI: Estimated body mass index is 23.63 kg/m  as calculated from the following:    Height as of 3/5/23: 1.524 m (5').    Weight as of 5/23/23: 54.9 kg (121 lb).     Sedation Ordered  MAC/deep sedation.   BMI<= 45 45 < BMI <= 48 48 < BMI < = 50  BMI > 50   No Restrictions No [facility]  No [facility]  [facility] with exceptions Hospital Only OR Only       Do you have a history of malignant hyperthermia or adverse reaction to anesthesia?  No    (Females) Are you currently pregnant?   No     Have you been diagnosed or told you have pulmonary hypertension?   No    Do you have an LVAD?  No    Have you been told you have moderate to severe sleep apnea?  No    Have you been told you have COPD, asthma, or any other lung disease?  No    Do you have any heart conditions?  No     Have you ever had or are you awaiting a heart or lung transplant?   No    Have you had a stroke or transient ischemic attack (TIA aka \"mini stroke\" in the last 6 months?   No    Have you been diagnosed with or been told you have cirrhosis of the liver?   No    Are you currently on dialysis?   No    Do you need assistance transferring?   Yes (Hospital Only)    BMI: Estimated body mass index is 23.63 kg/m  as calculated from the following:    Height as of 3/5/23: 1.524 m (5').    Weight as of 5/23/23: 54.9 kg (121 lb).     Is patients BMI > 40 and scheduling location [facility]?  No    Do you take the medication Phentermine, Ozempic or Wegovy?  No    Do you take the medication Naltrexone?  No    Do you take blood thinners?  No      Prep   Are you currently on dialysis or do you have chronic kidney " disease?  No    Do you have a diagnosis of diabetes?  No    Do you have a diagnosis of cystic fibrosis (CF)?  No    On a regular basis do you go 3 -5 days between bowel movements?  No    BMI > 40?  No    Preferred Pharmacy:    [place], [place] - [address]  [address]  [facility] [address]  Phone: [phone] Fax: [phone] Alternate Fax: [phone], [phone]      Final Scheduling Details   Colonoscopy prep sent?  MiraLAX (No Mag Citrate)    Procedure scheduled  Colonoscopy    Surgeon:  CHERIE Patrick      Date of procedure:  08/17/2023     Schedule PAC:   No    Location  [facility]    Sedation   MAC/Deep Sedation    Patient Reminders:    You will receive a call from a Nurse to review instructions and health history.  This assessment must be completed prior to your procedure.  Failure to complete the Nurse assessment may result in the procedure being cancelled.       On the day of your procedure, please designate an adult(s) who can drive you home stay with you for the next 24 hours. The medicines used in the exam will make you sleepy. You will not be able to drive.       You cannot take public transportation, ride share services, or non-medical taxi service without a responsible caregiver.  Medical transport services are allowed with the requirement that a responsible caregiver will receive you at your destination.  We require that drivers and caregivers are confirmed prior to your procedure.

## 2023-08-02 ENCOUNTER — TELEPHONE (OUTPATIENT)
Dept: GASTROENTEROLOGY | Facility: CLINIC | Age: 23
End: 2023-08-02
Payer: MEDICAID

## 2023-08-02 DIAGNOSIS — R10.31 ABDOMINAL PAIN, RIGHT LOWER QUADRANT: Primary | ICD-10-CM

## 2023-08-02 NOTE — TELEPHONE ENCOUNTER
Pre assessment completed via  ID [id number] for upcoming procedure.   (Please see previous telephone encounter notes for complete details)      Procedure details:    Arrival time and facility location reviewed    Pre op exam needed? N/A    Designated  policy reviewed. Instructed to have someone stay 24 hours post procedure.     COVID policy reviewed.      Medication review:    Medications reviewed. Please see supporting documentation below. Holding recommendations discussed (if applicable).       Prep for procedure:     Reviewed procedure prep instructions.       Additional information needed?  N/A      Patient  verbalized understanding and had no questions or concerns at this time.      Teresa Felipe RN  Endoscopy Procedure Pre Assessment RN  [phone] option 4

## 2023-08-02 NOTE — TELEPHONE ENCOUNTER
Pre visit planning completed.      Procedure details:    Patient scheduled for Colonoscopy  on 8/17/2023.     Arrival time: 0715. Procedure time 0845    Pre op exam needed? N/A    Facility location: [facility]; [address], [place] [address]    Sedation type: MAC    Indication for procedure: Abdominal pain, right lower quadrant       Chart review:     Electronic implanted devices? No    Diabetic? No    Diabetic medication HOLDING recommendations: (if applicable)  Oral diabetic medications: N/A  Diabetic injectables: N/A  Insulin: N/A      Medication review:    Anticoagulants? No    NSAIDS? No NSAID medications per patient's medication list.  RN will verify with pre-assessment call.    Other medication HOLDING recommendations:  N/A      Prep for procedure:     Bowel prep recommendation: Miralax prep without magnesium citrate   Due to:  standard bowel prep.    Prep instructions sent via [employer]     Teresa Felipe RN  Endoscopy Procedure Pre Assessment RN  [phone] option 4

## 2023-08-03 RX ORDER — BISACODYL 5 MG/1
TABLET, DELAYED RELEASE ORAL
Qty: 4 TABLET | Refills: 0 | Status: SHIPPED | OUTPATIENT
Start: 2023-08-03

## 2023-08-03 RX ORDER — BISACODYL 5 MG/1
TABLET, DELAYED RELEASE ORAL
Qty: 4 TABLET | Refills: 0 | OUTPATIENT
Start: 2023-08-03 | End: 2024-08-08

## 2023-08-03 NOTE — TELEPHONE ENCOUNTER
Per Dr. Orona and Dr. Feldman, they would like this pt to do the Extended Golytely Prep. Writer sent prescription to pt pharmacy and sent [employer] message with new prep instructions.     Pt called pt via  ID [phone] and advised him of this but unable to review instructions as pt is driving.    Pt states he will call us tomorrow. Teresa Felipe RN

## 2023-08-07 NOTE — TELEPHONE ENCOUNTER
[employer] message sent to pt with new instructions and called pt via  ID [id number] to go over procedure instructions. No answer, left message for callback to [phone] option #4.  Teresa Felipe RN

## 2023-08-16 ENCOUNTER — ANESTHESIA EVENT (OUTPATIENT)
Dept: GASTROENTEROLOGY | Facility: CLINIC | Age: 23
End: 2023-08-16
Payer: MEDICAID

## 2023-08-16 ENCOUNTER — TELEPHONE (OUTPATIENT)
Facility: CLINIC | Age: 23
End: 2023-08-16
Payer: MEDICAID

## 2023-08-16 RX ORDER — LIDOCAINE 40 MG/G
CREAM TOPICAL
Status: CANCELLED | OUTPATIENT
Start: 2023-08-16

## 2023-08-16 RX ORDER — ONDANSETRON 2 MG/ML
4 INJECTION INTRAMUSCULAR; INTRAVENOUS
Status: CANCELLED | OUTPATIENT
Start: 2023-08-16

## 2023-08-16 NOTE — TELEPHONE ENCOUNTER
Contacted pt to reschedule appointment via  as provider is not available at time. Lvmx1, sent myc

## 2023-08-16 NOTE — ANESTHESIA PREPROCEDURE EVALUATION
Anesthesia Pre-Procedure Evaluation    Patient: Ruben Van   MRN: [id number] : 2000        Procedure : Procedure(s):  Colonoscopy          No past medical history on file.   No past surgical history on file.   No Known Allergies   Social History     Tobacco Use    Smoking status: Never    Smokeless tobacco: Never   Substance Use Topics    Alcohol use: Yes      Wt Readings from Last 1 Encounters:   23 54.9 kg (121 lb)        Anesthesia Evaluation   Pt has not had prior anesthetic         ROS/MED HX  ENT/Pulmonary:       Neurologic:       Cardiovascular:       METS/Exercise Tolerance:     Hematologic:       Musculoskeletal:       GI/Hepatic: Comment: 2-3 month history of RUQ abdominal pain. Possible intusseption on CT scan but without SBO from May 23      Renal/Genitourinary:       Endo:       Psychiatric/Substance Use:       Infectious Disease:       Malignancy:       Other:               OUTSIDE LABS:  CBC:   Lab Results   Component Value Date    WBC 5.2 2023    WBC 9.0 2022    HGB 16.2 2023    HGB 16.1 2022    HCT 46.2 2023    HCT 46.7 2022     2023     2022     BMP:   Lab Results   Component Value Date     2023     2022    POTASSIUM 3.5 2023    POTASSIUM 3.4 2022    CHLORIDE 98 2023    CHLORIDE 105 2022    CO2 25 2023    CO2 26 2022    BUN 12.6 2023    BUN 18 2022    CR 0.95 2023    CR 0.86 2022     (H) 2023     (H) 2022     COAGS: No results found for: PTT, INR, FIBR  POC: No results found for: BGM, HCG, HCGS  HEPATIC:   Lab Results   Component Value Date    ALBUMIN 4.8 2023    PROTTOTAL 7.8 2023    ALT 21 2023    AST 24 2023    ALKPHOS 90 2023    BILITOTAL 1.2 2023     OTHER:   Lab Results   Component Value Date    DISHA 9.6 2023    MAG 2.2 2022    CRP <2.9 2022    SED 6  06/06/2022       Anesthesia Plan    ASA Status:  2       Anesthesia Type: MAC.     - Reason for MAC: immobility needed   Induction: Propofol.   Maintenance: TIVA.        Consents    Anesthesia Plan(s) and associated risks, benefits, and realistic alternatives discussed. Questions answered and patient/representative(s) expressed understanding.     - Discussed: Risks, Benefits and Alternatives for the PROCEDURE were discussed     - Discussed with:  Patient      - Extended Intubation/Ventilatory Support Discussed: No.      - Patient is DNR/DNI Status: No     Use of blood products discussed: No .     Postoperative Care    Pain management: IV analgesics, Multi-modal analgesia.   PONV prophylaxis: Ondansetron (or other 5HT-3), Dexamethasone or Solumedrol     Comments:                SUBHA WILD MD

## 2023-08-17 ENCOUNTER — ANESTHESIA (OUTPATIENT)
Dept: GASTROENTEROLOGY | Facility: CLINIC | Age: 23
End: 2023-08-17
Payer: MEDICAID

## 2023-09-11 ENCOUNTER — TELEPHONE (OUTPATIENT)
Dept: GASTROENTEROLOGY | Facility: CLINIC | Age: 23
End: 2023-09-11
Payer: MEDICAID

## 2023-09-11 NOTE — TELEPHONE ENCOUNTER
LORIM with , sent mychart to reschedule the following appointment:    Appt on 10/20/23 with Dr. Orona due to the provider not being available. Ok to reschedule with the same provider, use another return slot. There are openings on Nov 14, and Nov 28 otherwise next Return slot is going into Jan 2024. Search manually.     Left K pod #

## 2023-09-14 ENCOUNTER — TELEPHONE (OUTPATIENT)
Dept: GASTROENTEROLOGY | Facility: CLINIC | Age: 23
End: 2023-09-14
Payer: MEDICAID

## 2023-09-14 NOTE — TELEPHONE ENCOUNTER
Contacted pt to schedule appointment as Dr. Orona is not available at time. Lvmx2, sent myc  Sent letter.

## 2024-07-27 ENCOUNTER — HEALTH MAINTENANCE LETTER (OUTPATIENT)
Age: 24
End: 2024-07-27

## 2025-08-10 ENCOUNTER — HEALTH MAINTENANCE LETTER (OUTPATIENT)
Age: 25
End: 2025-08-10